# Patient Record
Sex: MALE | Race: WHITE
[De-identification: names, ages, dates, MRNs, and addresses within clinical notes are randomized per-mention and may not be internally consistent; named-entity substitution may affect disease eponyms.]

---

## 2020-03-20 ENCOUNTER — HOSPITAL ENCOUNTER (EMERGENCY)
Dept: HOSPITAL 94 - ER | Age: 55
Discharge: HOME | End: 2020-03-20
Payer: COMMERCIAL

## 2020-03-20 VITALS — SYSTOLIC BLOOD PRESSURE: 139 MMHG | DIASTOLIC BLOOD PRESSURE: 110 MMHG

## 2020-03-20 VITALS — BODY MASS INDEX: 25.11 KG/M2 | WEIGHT: 175.38 LBS | HEIGHT: 70 IN

## 2020-03-20 DIAGNOSIS — Z56.0: ICD-10-CM

## 2020-03-20 DIAGNOSIS — Z86.14: ICD-10-CM
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
## 2020-03-20 DIAGNOSIS — F10.20: Primary | ICD-10-CM

## 2020-03-20 DIAGNOSIS — Z79.899: ICD-10-CM

## 2020-03-20 PROCEDURE — 99283 EMERGENCY DEPT VISIT LOW MDM: CPT

## 2020-03-20 SDOH — ECONOMIC STABILITY - INCOME SECURITY: UNEMPLOYMENT, UNSPECIFIED: Z56.0

## 2024-10-17 ENCOUNTER — OFFICE VISIT (OUTPATIENT)
Age: 59
End: 2024-10-17
Payer: COMMERCIAL

## 2024-10-17 ENCOUNTER — HOSPITAL ENCOUNTER (OUTPATIENT)
Dept: GENERAL RADIOLOGY | Age: 59
Discharge: HOME | End: 2024-10-17
Payer: COMMERCIAL

## 2024-10-17 VITALS
SYSTOLIC BLOOD PRESSURE: 128 MMHG | OXYGEN SATURATION: 97 % | TEMPERATURE: 97.9 F | HEART RATE: 78 BPM | WEIGHT: 164 LBS | DIASTOLIC BLOOD PRESSURE: 78 MMHG | RESPIRATION RATE: 18 BRPM

## 2024-10-17 DIAGNOSIS — M25.522 ELBOW PAIN, LEFT: ICD-10-CM

## 2024-10-17 DIAGNOSIS — M25.522 ELBOW PAIN, LEFT: Primary | ICD-10-CM

## 2024-10-17 PROCEDURE — 73080 X-RAY EXAM OF ELBOW: CPT

## 2024-10-17 PROCEDURE — 99203 OFFICE O/P NEW LOW 30 MIN: CPT | Performed by: FAMILY MEDICINE

## 2024-10-17 RX ORDER — ASPIRIN 81 MG/1
81 TABLET ORAL DAILY
COMMUNITY

## 2024-10-17 ASSESSMENT — ENCOUNTER SYMPTOMS
SHORTNESS OF BREATH: 0
BLOOD IN STOOL: 0
ABDOMINAL PAIN: 0
WHEEZING: 0
CHEST TIGHTNESS: 0

## 2024-10-17 NOTE — PROGRESS NOTES
Humphrey Aranda (:  1965) is a 59 y.o. male,Established patient, here for evaluation of the following chief complaint(s):  Elbow Injury (Left twisted it at home 2 weeks ago.)      Assessment & Plan   ASSESSMENT/PLAN:  1. Elbow pain, left  -     XR ELBOW LEFT (MIN 3 VIEWS); Future      Based on patient's symptoms I think that there could be some damage in the articular surface between the proximal radius and ulna.  Will proceed with x-ray today however I do not think that this is going to be very revealing.  I discussed with patient I think that an MRI is likely going to be necessary however insurance will not likely pay for that without an x-ray being completed first.  He was agreeable to proceeding with x-ray.  I discussed with patient potential main management options and he would prefer to refrain from any additional pain medication at this time.  Did recommend the patient follow-up with someone for completion of MRI and further management      Return if symptoms worsen or fail to improve.         Subjective   SUBJECTIVE/OBJECTIVE:  Humphrey Aranda is a 59 y.o. male who presents due to left elbow pain.  Patient says that this started suddenly after he threw a 4 x 4 a couple weeks ago.  Patient says that when he did this he felt 3 loud pops in the elbow.  Patient says that pronation is now very painful to do.  He has not taken anything for his pain.  He is having some trouble gripping things secondary to the pain and says that he is very mildly weak.  He denies any numbness or tingling                Review of Systems   Constitutional:  Negative for activity change and fever.   HENT:  Negative for congestion.    Eyes:  Negative for visual disturbance.   Respiratory:  Negative for chest tightness, shortness of breath and wheezing.    Cardiovascular:  Negative for chest pain.   Gastrointestinal:  Negative for abdominal pain and blood in stool.   Genitourinary:  Negative for difficulty urinating and

## 2024-10-21 ENCOUNTER — TELEPHONE (OUTPATIENT)
Age: 59
End: 2024-10-21